# Patient Record
Sex: FEMALE | Race: OTHER | HISPANIC OR LATINO | Employment: FULL TIME | URBAN - METROPOLITAN AREA
[De-identification: names, ages, dates, MRNs, and addresses within clinical notes are randomized per-mention and may not be internally consistent; named-entity substitution may affect disease eponyms.]

---

## 2022-11-29 PROBLEM — J02.9 ACUTE PHARYNGITIS: Status: RESOLVED | Noted: 2022-09-30 | Resolved: 2022-11-29

## 2023-01-25 PROBLEM — G89.29 CHRONIC LEFT SHOULDER PAIN: Status: ACTIVE | Noted: 2023-01-25

## 2023-01-25 PROBLEM — M25.512 CHRONIC LEFT SHOULDER PAIN: Status: ACTIVE | Noted: 2023-01-25

## 2023-03-02 ENCOUNTER — HOSPITAL ENCOUNTER (EMERGENCY)
Facility: HOSPITAL | Age: 58
Discharge: HOME | End: 2023-03-02
Attending: EMERGENCY MEDICINE

## 2023-03-02 ENCOUNTER — APPOINTMENT (EMERGENCY)
Dept: RADIOLOGY | Facility: HOSPITAL | Age: 58
End: 2023-03-02

## 2023-03-02 ENCOUNTER — OCCMED (OUTPATIENT)
Dept: URGENT CARE | Facility: CLINIC | Age: 58
End: 2023-03-02

## 2023-03-02 VITALS
TEMPERATURE: 99 F | RESPIRATION RATE: 18 BRPM | SYSTOLIC BLOOD PRESSURE: 140 MMHG | HEART RATE: 99 BPM | DIASTOLIC BLOOD PRESSURE: 75 MMHG | OXYGEN SATURATION: 97 %

## 2023-03-02 DIAGNOSIS — M25.551 RIGHT HIP PAIN: ICD-10-CM

## 2023-03-02 DIAGNOSIS — S76.011A HIP STRAIN, RIGHT, INITIAL ENCOUNTER: ICD-10-CM

## 2023-03-02 DIAGNOSIS — S83.91XA RIGHT KNEE SPRAIN: Primary | ICD-10-CM
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
## 2023-03-02 DIAGNOSIS — M25.561 ACUTE PAIN OF RIGHT KNEE: Primary | ICD-10-CM

## 2023-03-02 DIAGNOSIS — M54.50 ACUTE RIGHT-SIDED LOW BACK PAIN, UNSPECIFIED WHETHER SCIATICA PRESENT: ICD-10-CM

## 2023-03-02 DIAGNOSIS — M79.661 TENDERNESS OF RIGHT CALF: ICD-10-CM

## 2023-03-02 RX ORDER — OXYCODONE HYDROCHLORIDE 5 MG/1
5 TABLET ORAL ONCE
Status: COMPLETED | OUTPATIENT
Start: 2023-03-02 | End: 2023-03-02

## 2023-03-02 RX ORDER — NAPROXEN 500 MG/1
500 TABLET ORAL 2 TIMES DAILY WITH MEALS
Qty: 20 TABLET | Refills: 0 | Status: SHIPPED | OUTPATIENT
Start: 2023-03-02

## 2023-03-02 RX ORDER — KETOROLAC TROMETHAMINE 30 MG/ML
30 INJECTION, SOLUTION INTRAMUSCULAR; INTRAVENOUS ONCE
Status: COMPLETED | OUTPATIENT
Start: 2023-03-02 | End: 2023-03-02

## 2023-03-02 RX ADMIN — OXYCODONE HYDROCHLORIDE 5 MG: 5 TABLET ORAL at 19:07

## 2023-03-02 RX ADMIN — KETOROLAC TROMETHAMINE 30 MG: 30 INJECTION, SOLUTION INTRAMUSCULAR at 21:09

## 2023-03-02 NOTE — ED PROVIDER NOTES
History  Chief Complaint   Patient presents with   • Knee Injury     Slipped on wet floor yesterday AM  Tess Riedel to urgent care and told to come here for xray and psb US of right knee/calf  Denies hitting head, no LOC  History provided by:  Patient   used: Yes    69-year-old female presented for evaluation after a slip and fall at work early yesterday morning  States that there was water on the floor in the cafeteria, slipped landing on her right knee  She has had pain since, walking with a limp  Unable to go to work today due to the pain  Was seen in urgent care and referred to the ED for imaging  She has been taking Tylenol at home without improvement  Denies head strike  No anticoagulation  No neck or midline back pain  States pain radiates from the right buttock down the leg  Pain is worst about the knee and worse when she attempts to flex the knee  No significant prior injuries  She does report some paresthesias in the right leg  On exam here she seems uncomfortable  She has tenderness about the knee  Range of motion is limited secondary to pain  No ligamentous laxity noted  She has tenderness on the lateral aspect of the thigh and over the right lateral buttock  There is no ecchymosis or hematoma  She has no C-spine, T-spine, L-spine tenderness  No external signs of head trauma  Urgent care notes reviewed -- they had concern for DVT or compartment syndrome as well but her history and exam are not consistent  Injury occurred by direct trauma yesterday  Not consistent with DVT  Her compartments remain soft  Normal pulses, objective sensation  Plan x-rays, pain control, reevaluate      None       Past Medical History:   Diagnosis Date   • Diabetes mellitus [facility])        Past Surgical History:   Procedure Laterality Date   • APPENDECTOMY LAPAROSCOPIC N/A 10/26/2021    Procedure: APPENDECTOMY LAPAROSCOPIC;  Surgeon: Yesenia Gutierrez MD;  Location:  MAIN OR; Service: General   • CHOLECYSTECTOMY     • LAPAROSCOPIC APPENDECTOMY  10/26/2021    Dr Yesi Alcantar, [address]   • OOPHORECTOMY  2005       Family History   Problem Relation Age of Onset   • Diabetes Mother    • No Known Problems Son    • No Known Problems Daughter    • No Known Problems Son      I have reviewed and agree with the history as documented  E-Cigarette/Vaping   • E-Cigarette Use Never User      E-Cigarette/Vaping Substances   • Nicotine No    • THC No    • CBD No    • Flavoring No    • Other No    • Unknown No      Social History     Tobacco Use   • Smoking status: Never   • Smokeless tobacco: Never   Vaping Use   • Vaping Use: Never used   Substance Use Topics   • Alcohol use: Yes     Comment: occasional   • Drug use: Never       Review of Systems   Constitutional: Negative for activity change, appetite change and fatigue  Respiratory: Negative for chest tightness and shortness of breath  Gastrointestinal: Negative for abdominal pain, nausea and vomiting  Genitourinary: Negative for difficulty urinating  Musculoskeletal: Positive for gait problem  Negative for back pain and neck pain  Skin: Negative for color change and wound  Neurological: Positive for numbness  Negative for dizziness, weakness and headaches  All other systems reviewed and are negative  Physical Exam  Physical Exam  Vitals and nursing note reviewed  Constitutional:       Appearance: Normal appearance  HENT:      Head: Normocephalic and atraumatic  Cardiovascular:      Rate and Rhythm: Normal rate and regular rhythm  Pulses: Normal pulses  Pulmonary:      Effort: Pulmonary effort is normal    Abdominal:      General: There is no distension  Palpations: Abdomen is soft  Musculoskeletal:      Cervical back: Normal range of motion and neck supple  No tenderness  Comments: Tenderness about the right knee and lateral thigh, right lateral buttocks    Range of motion of the knee limited secondary to pain  No laxity  Skin:     General: Skin is warm and dry  Capillary Refill: Capillary refill takes less than 2 seconds  Findings: No bruising or erythema  Neurological:      Mental Status: She is alert and oriented to person, place, and time  Motor: No weakness  Comments: Reports paresthesias of the right leg but objective touch sensation remains intact  Psychiatric:         Mood and Affect: Mood normal          Behavior: Behavior normal          Vital Signs  ED Triage Vitals [03/02/23 1745]   Temperature Pulse Respirations Blood Pressure SpO2   99 °F (37 2 °C) 99 18 140/75 97 %      Temp Source Heart Rate Source Patient Position - Orthostatic VS BP Location FiO2 (%)   Oral Monitor Sitting -- --      Pain Score       --           Vitals:    03/02/23 1745   BP: 140/75   Pulse: 99   Patient Position - Orthostatic VS: Sitting         Visual Acuity      ED Medications  Medications   oxyCODONE (ROXICODONE) IR tablet 5 mg (5 mg Oral Given 3/2/23 1907)   ketorolac (TORADOL) injection 30 mg (30 mg Intramuscular Given 3/2/23 2109)       Diagnostic Studies  Results Reviewed     None                 XR hip/pelv 2-3 vws right   Final Result by Josee Graham MD (03/02 2035)      No acute osseous abnormality  Workstation performed: [id number]         XR knee 4+ views Right injury   ED Interpretation by Declan Barraza MD (03/02 1951)   No fracture  Procedures  Procedures         ED Course  ED Course as of 03/02/23 2116   u Mar 02, 2023   2050 X-rays negative for fracture/dislocation  2106 Discussed x-ray findings with patient via   She denies any improvement with oxycodone  We will give Toradol  Plan discharge with NSAIDs  She was provided crutches  We will give note for work  Able to return when cleared for duties by occupational health physician                                               Medical Decision Making  80-year-old female presented for evaluation of right knee and hip pain after a fall early yesterday morning at work  Had slipped on wet floor in the cafeteria  Has been having difficulty bearing weight on the right leg since  Tenderness of the knee and lateral/posterior hip/buttock  X-rays negative for fractures  Range of motion is limited secondary to pain  Provided crutches  Stable for discharge home  Weightbearing as tolerated  NSAIDs  Orthopedics if symptoms persist   Given note for work, to be cleared by occupational health  Hip strain, right, initial encounter: acute illness or injury  Right knee sprain: acute illness or injury  Amount and/or Complexity of Data Reviewed  Radiology: ordered and independent interpretation performed  Risk  Prescription drug management  Disposition  Final diagnoses:   Right knee sprain   Hip strain, right, initial encounter     Time reflects when diagnosis was documented in both MDM as applicable and the Disposition within this note     Time User Action Codes Description Comment    3/2/2023  8:51 PM [address] Right knee sprain     3/2/2023  8:52 PM Ramesh Wood Add [S76 011A] Hip strain, right, initial encounter       ED Disposition     ED Disposition   Discharge    Condition   Stable    Date/Time   Thu Mar 2, 2023  8:51 PM    Comment   [employer] discharge to home/self care                 Follow-up Information     Follow up With Specialties Details Why Contact Info Additional Information    [facility] Emergency Medicine  If symptoms worsen [address] Emergency Department, [address], [place], [place], [address] [facility]  As needed Cristy [address] [place] [id number]  [address] [facility], [address] [place] [address] [facility] [place], [place], [phone]          Patient's Medications   Discharge Prescriptions    NAPROXEN (NAPROSYN) 500 MG TABLET    Take 1 tablet (500 mg total) by mouth 2 (two) times a day with meals       Start Date: 3/2/2023  End Date: --       Order Dose: 500 mg       Quantity: 20 tablet    Refills: 0       No discharge procedures on file      PDMP Review       Value Time User    PDMP Reviewed  Yes 10/27/2021  2:13 PM Mau Gamboa MD          ED Provider  Electronically Signed by           Jad Neri MD  03/02/23 0827

## 2023-03-02 NOTE — Clinical Note
Heather Day was seen and treated in our emergency department on 3/2/2023  Diagnosis: Right knee sprain  Right hip strain  Alba    She may return on this date: May return to work when cleared by occupational health  If you have any questions or concerns, please don't hesitate to call        Melissa Dsouza MD    ______________________________           _______________          _______________  Hospital Representative                              Date                                Time Routine  care, AGA, , BF  F/u [place]

## 2023-03-05 ENCOUNTER — APPOINTMENT (OUTPATIENT)
Dept: URGENT CARE | Facility: CLINIC | Age: 58
End: 2023-03-05

## 2023-03-10 ENCOUNTER — APPOINTMENT (OUTPATIENT)
Dept: URGENT CARE | Facility: CLINIC | Age: 58
End: 2023-03-10

## 2023-03-21 ENCOUNTER — EVALUATION (OUTPATIENT)
Dept: PHYSICAL THERAPY | Facility: CLINIC | Age: 58
End: 2023-03-21

## 2023-03-21 ENCOUNTER — APPOINTMENT (OUTPATIENT)
Dept: URGENT CARE | Facility: CLINIC | Age: 58
End: 2023-03-21

## 2023-03-21 DIAGNOSIS — S86.911D KNEE STRAIN, RIGHT, SUBSEQUENT ENCOUNTER: ICD-10-CM

## 2023-03-21 DIAGNOSIS — M25.561 ACUTE PAIN OF RIGHT KNEE: Primary | ICD-10-CM

## 2023-03-21 NOTE — PROGRESS NOTES
PT Evaluation     Today's date: 3/21/2023  Patient name: Adia Buchanan  : 1965  MRN: [id number]  Referring provider: Jerrell Huerta  Dx:   Encounter Diagnosis     ICD-10-CM    1  Acute pain of right knee  M25 561       2  Knee strain, right, subsequent encounter  R09 [id number]                      Assessment  Assessment details: Problem List:  1) knee hypomobility  2) impaired quad control     Adia Buchanan is a pleasant 62 y o  female who presents with acute R knee pain after a fall at work on 3/2/23 resulting in difficulty with ADLs and functional activities  She has impaired knee mobility and overall impaired quad motor control resulting in difficulty with gait, transfers, and functional mobility  Her greatest concerns are the pain she is experiencing, worry over not knowing what's wrong, concern at no signs of improvement, fear of not being able to keep active and being able to return to work  Patient would benefit from further consultation from orthopedic if she doesn't see improvements with conservative care  She was agreeable to POC 2x/week  Positive prognostic indicators include acuity of symptoms  Negative prognostic indicators include high symptom irritability and language barrier  Comparable signs:  1) knee movement  2) walking  Impairments: abnormal gait, abnormal muscle firing, abnormal muscle tone, abnormal or restricted ROM, abnormal movement, activity intolerance, difficulty understanding, impaired balance, impaired physical strength, lacks appropriate home exercise program, pain with function and weight-bearing intolerance    Symptom irritability: moderateUnderstanding of Dx/Px/POC: good   Prognosis: good    Goals  ST  Patient will demonstrate full knee extension in 4 weeks  2  Patient will demonstrate knee flexion > 120 degrees in 4 weeks  LT  Patient will be able to negotiate stairs reciprocally in 8 weeks     2  Patient will be able to squat and lift > 10 lbs in 8 weeks  3  Patient will be independent with home exercise program    4  Patient will be able to manage symptoms independently  Plan  Patient would benefit from: skilled physical therapy  Planned modality interventions: cryotherapy  Planned therapy interventions: home exercise program, flexibility, functional ROM exercises, graded activity, strengthening, stretching, therapeutic activities, therapeutic exercise, patient education, postural training, motor coordination training, muscle pump exercises, neuromuscular re-education, manual therapy, joint mobilization, abdominal trunk stabilization, activity modification, balance, balance/weight bearing training and body mechanics training  Other planned therapy interventions: kinesiotaping, Ruffin tape  Frequency: 2x week  Duration in weeks: 8  Treatment plan discussed with: patient, PTA and referring physician        Subjective Evaluation    History of Present Illness  Onset date: 3/2/23  Mechanism of injury: Translation with :     She notes that she slipped at work  She notes that her L knee slipped  She was sitting at work to work  She went to the urgent care the next day  She went to the ER  They took x-rays  She was told it wasn't broken and she was given crutches  She had an injury to the arm previously so using the crutches for a little bit hurts her arm  She is using a brace that she was given by urgent care  She notes that the butt is hurting  She has numbness in the knee  She notes a lot of swelling  She works at a [employer]  She was given restrictions but her work doesn't respect them  She works 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, 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 works 8 hours typically     Pain  Current pain ratin  At worst pain ratin  Location: R knee front to lateral/posterior knee  Quality: throbbing  Relieving factors: rest, relaxation, support and medications  Aggravating factors: stair climbing, standing and walking          Objective     Tenderness     Right Knee   Tenderness in the ITB, lateral joint line, lateral patella, medial joint line, medial patella, quadriceps tendon and superior patella  No tenderness in the patellar tendon  Additional Tenderness Details  Joint effusion throughout knee; hypersensitivity to touch throughout; TTP ITB/buttock attributed to landing    Active Range of Motion   Left Knee   Flexion: 135 degrees   Extension: 0 degrees     Right Knee   Flexion: 80 degrees   Extension: 18 degrees     Tests     Left Knee   Negative anterior Lachman, patellar apprehension, patellar compression, valgus stress test at 0 degrees, valgus stress test at 30 degrees, varus stress test at 0 degrees and varus stress test at 30 degrees  Right Knee   Positive patellar apprehension, patellar compression and varus stress test at 30 degrees  Negative anterior Lachman, valgus stress test at 0 degrees, valgus stress test at 30 degrees and varus stress test at 0 degrees         Flowsheet Rows    Flowsheet Row Most Recent Value   PT/OT G-Codes    Current Score 29   Projected Score 56   FOTO information reviewed Yes              Diagnosis: R knee strain, contusion (Patellofemoral pain- R knee hypomobility/impaired quad motor control)   Precautions: DM, Uzbek speaking      Manuals 3/21            Patella mobility             PROM R knee             kinesio patella taping RS                         Neuro Re-Ed                          Quad set             SLR flexion             clamshells             bridges                          TKE             Ther Ex             bike             gastroc stretch             Heel slides                          Leg Press                                                    Ther Activity             Sit to stands                                                                 Gait Training                                       Modalities

## 2023-03-21 NOTE — LETTER
2023    Lindsey Roblero, [address] [facility] [address]    Patient: Shanna Amador   YOB: 1965   Date of Visit: 3/21/2023     Encounter Diagnosis     ICD-10-CM    1  Acute pain of right knee  M25 561       2  Knee strain, right, subsequent encounter  S86 911D           Dear Dr Vidal Razo:    Thank you for your recent referral of Shanna Amador  Please review the attached evaluation summary from Alba's recent visit  Please verify that you agree with the plan of care by signing the attached order  If you have any questions or concerns, please do not hesitate to call  I sincerely appreciate the opportunity to share in the care of one of your patients and hope to have another opportunity to work with you in the near future  Sincerely,    Loren Mccarty, PT      Referring Provider:      I certify that I have read the below Plan of Care and certify the need for these services furnished under this plan of treatment while under my care  Lindsey Roblero PA-C  [address] [place] [id number]  Via Fax: [phone]          PT Evaluation     Today's date: 3/21/2023  Patient name: Shanna Amador  : 1965  MRN: [id number]  Referring provider: Guillermina Crouch  Dx:   Encounter Diagnosis     ICD-10-CM    1  Acute pain of right knee  M25 561       2  Knee strain, right, subsequent encounter  S86 911D                      Assessment  Assessment details: Problem List:  1) knee hypomobility  2) impaired quad control     Shanna Amador is a pleasant 62 y o  female who presents with acute R knee pain after a fall at work on 3/2/23 resulting in difficulty with ADLs and functional activities  She has impaired knee mobility and overall impaired quad motor control resulting in difficulty with gait, transfers, and functional mobility   Her greatest concerns are the pain she is experiencing, worry over not knowing what's wrong, concern at no signs of improvement, fear of not being able to keep active and being able to return to work  Patient would benefit from further consultation from orthopedic if she doesn't see improvements with conservative care  She was agreeable to POC 2x/week  Positive prognostic indicators include acuity of symptoms  Negative prognostic indicators include high symptom irritability and language barrier  Comparable signs:  1) knee movement  2) walking  Impairments: abnormal gait, abnormal muscle firing, abnormal muscle tone, abnormal or restricted ROM, abnormal movement, activity intolerance, difficulty understanding, impaired balance, impaired physical strength, lacks appropriate home exercise program, pain with function and weight-bearing intolerance    Symptom irritability: moderateUnderstanding of Dx/Px/POC: good   Prognosis: good    Goals  ST  Patient will demonstrate full knee extension in 4 weeks  2  Patient will demonstrate knee flexion > 120 degrees in 4 weeks  LT  Patient will be able to negotiate stairs reciprocally in 8 weeks  2  Patient will be able to squat and lift > 10 lbs in 8 weeks  3  Patient will be independent with home exercise program    4  Patient will be able to manage symptoms independently       Plan  Patient would benefit from: skilled physical therapy  Planned modality interventions: cryotherapy  Planned therapy interventions: home exercise program, flexibility, functional ROM exercises, graded activity, strengthening, stretching, therapeutic activities, therapeutic exercise, patient education, postural training, motor coordination training, muscle pump exercises, neuromuscular re-education, manual therapy, joint mobilization, abdominal trunk stabilization, activity modification, balance, balance/weight bearing training and body mechanics training  Other planned therapy interventions: kinesiotaping, Ruffin tape  Frequency: 2x week  Duration in weeks: 8  Treatment plan discussed with: patient, PTA and referring physician        Subjective Evaluation    History of Present Illness  Onset date: 3/2/23  Mechanism of injury: Translation with :     She notes that she slipped at work  She notes that her L knee slipped  She was sitting at work to work  She went to the urgent care the next day  She went to the ER  They took x-rays  She was told it wasn't broken and she was given crutches  She had an injury to the arm previously so using the crutches for a little bit hurts her arm  She is using a brace that she was given by urgent care  She notes that the butt is hurting  She has numbness in the knee  She notes a lot of swelling  She works at [employer]  She was given restrictions but her work doesn't respect them  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, 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 works 8 hours typically  Pain  Current pain ratin  At worst pain ratin  Location: R knee front to lateral/posterior knee  Quality: throbbing  Relieving factors: rest, relaxation, support and medications  Aggravating factors: stair climbing, standing and walking          Objective     Tenderness     Right Knee   Tenderness in the ITB, lateral joint line, lateral patella, medial joint line, medial patella, quadriceps tendon and superior patella  No tenderness in the patellar tendon  Additional Tenderness Details  Joint effusion throughout knee; hypersensitivity to touch throughout; TTP ITB/buttock attributed to landing    Active Range of Motion   Left Knee   Flexion: 135 degrees   Extension: 0 degrees     Right Knee   Flexion: 80 degrees   Extension: 18 degrees     Tests     Left Knee   Negative anterior Lachman, patellar apprehension, patellar compression, valgus stress test at 0 degrees, valgus stress test at 30 degrees, varus stress test at 0 degrees and varus stress test at 30 degrees       Right Knee   Positive patellar apprehension, patellar compression and varus stress test at 30 degrees  Negative anterior Lachman, valgus stress test at 0 degrees, valgus stress test at 30 degrees and varus stress test at 0 degrees         Flowsheet Rows    Flowsheet Row Most Recent Value   PT/OT G-Codes    Current Score 29   Projected Score 56   FOTO information reviewed Yes             Diagnosis: R knee strain, contusion (Patellofemoral pain- R knee hypomobility/impaired quad motor control)   Precautions: DM, Puerto Rican speaking      Manuals 3/21            Patella mobility             PROM R knee             kinesio patella taping RS                         Neuro Re-Ed                          Quad set             SLR flexion             clamshells             bridges                          TKE             Ther Ex             bike             gastroc stretch             Heel slides                          Leg Press                                                    Ther Activity             Sit to stands                                                                 Gait Training                                       Modalities

## 2023-03-22 ENCOUNTER — OFFICE VISIT (OUTPATIENT)
Dept: PHYSICAL THERAPY | Facility: CLINIC | Age: 58
End: 2023-03-22

## 2023-03-22 DIAGNOSIS — M25.561 ACUTE PAIN OF RIGHT KNEE: Primary | ICD-10-CM

## 2023-03-22 DIAGNOSIS — S86.911D KNEE STRAIN, RIGHT, SUBSEQUENT ENCOUNTER: ICD-10-CM

## 2023-03-22 NOTE — PROGRESS NOTES
Daily Note     Today's date: 3/22/2023  Patient name: Brendan Rasmussen  :   MRN: [id number]  Referring provider: Bong Kramer  Dx:   Encounter Diagnosis     ICD-10-CM    1  Acute pain of right knee  M25 561       2  Knee strain, right, subsequent encounter  [id number]                      Subjective: Patient reports that her tape was okay and the knee felt better while it was on  She denies any itching from tape  Objective: See treatment diary below    R knee extension AAROM: 5 degrees with gastroc stretch      Assessment: Tolerated treatment well  Patient would benefit from continued PT  She demonstrated improved tolerance to pendulums on bike for ROM as time went on  She required encouragement for AAROM into flexion this date  She did demonstrate greater knee extension since IE yesterday  She remains guarded and self limiting due to pain with knee flexion  Plan: Continue per plan of care        Diagnosis: R knee strain, contusion (Patellofemoral pain- R knee hypomobility/impaired quad motor control)   Precautions: DM, Mozambican speaking      Manuals 3/21 3/22           Patella mobility  RS           PROM R knee  RS           kinesio patella taping RS RS                        Neuro Re-Ed                          Quad set  5"x20 into towel for biofeedback           SLR flexion             clamshells             bridges                          TKE             Ther Ex             bike  5' pendulums             gastroc stretch  10x10"            Heel slides  5"x20           Seated EOB knee flexion stretch  contralteral OP  5"x10           Leg Press                                                    Ther Activity             Sit to stands                                                                 Gait Training                                       Modalities             CP post session  10'

## 2023-03-28 ENCOUNTER — APPOINTMENT (OUTPATIENT)
Dept: URGENT CARE | Facility: CLINIC | Age: 58
End: 2023-03-28

## 2023-03-28 ENCOUNTER — OFFICE VISIT (OUTPATIENT)
Dept: PHYSICAL THERAPY | Facility: CLINIC | Age: 58
End: 2023-03-28

## 2023-03-28 DIAGNOSIS — S86.911D KNEE STRAIN, RIGHT, SUBSEQUENT ENCOUNTER: ICD-10-CM

## 2023-03-28 DIAGNOSIS — M25.561 ACUTE PAIN OF RIGHT KNEE: Primary | ICD-10-CM

## 2023-03-29 ENCOUNTER — OFFICE VISIT (OUTPATIENT)
Dept: PHYSICAL THERAPY | Facility: CLINIC | Age: 58
End: 2023-03-29

## 2023-03-29 DIAGNOSIS — S86.911D KNEE STRAIN, RIGHT, SUBSEQUENT ENCOUNTER: Primary | ICD-10-CM

## 2023-03-29 DIAGNOSIS — M25.561 ACUTE PAIN OF RIGHT KNEE: ICD-10-CM

## 2023-03-29 NOTE — PROGRESS NOTES
"Daily Note     Today's date: 3/29/2023  Patient name: Sawyer Oliver  :   MRN: [id number]  Referring provider: Ivan Wyatt  Dx:   Encounter Diagnosis     ICD-10-CM    1  Knee strain, right, subsequent encounter  S86 911D       2  Acute pain of right knee  M25 652                      Subjective:She reports that she saw the doctor yesterday and he told her to continue with PT        Objective: See treatment diary below    R knee flexion AROM with encouragement: 80 degrees       Assessment: Tolerated treatment fair  Patient would benefit from continued PT  She demonstrated increased knee flexion AAROM with upright bike for pendulums this date  She demonstrated improved knee flexion in sitting on EOB  She demonstrated quad lag with SLR flexion  She was challenged to perform marching ambulation with hand hold assist due to impaired R SLS tolerance  She demonstrated less muscle guarding with PROM flexion this date  Plan: Continue per plan of care        Diagnosis: R knee strain, contusion (Patellofemoral pain- R knee hypomobility/impaired quad motor control)   Precautions: DM, Serbian speaking      Manuals 3/21 3/22 3/28 3/29         Patella mobility  RS RS RS         PROM R knee  RS RS RS         kinesio patella taping RS RS                        Neuro Re-Ed                          Quad set  5\"x20 into towel for biofeedback 5\"x20 into towel for biofeedback prone 5\"x20    Supine with towel for biofeedback  5\"x20         SLR flexion    2x5          clamshells             bridges             Adductor ball squeeze     5\"x20         TKE   Purple ball at wall  5\"x20          Ther Ex             Bike for ROM  5' pendulums   5' pendulums  5' pendulums upright bike         gastroc stretch  10x10\"  10x10\"           Heel slides  5\"x20 Decline on wedge  5\"x20          Seated EOB knee flexion stretch  contralteral OP  5\"x10 contralateral OP 5\"x10 contralateral OP  5\"x20         Leg Press               " Ther Activity             Sit to stands                                                                 Gait Training             Marching with hand hold assist    1 lap with PT contact guard                      Modalities             CP post session  10'

## 2023-06-02 ENCOUNTER — OFFICE VISIT (OUTPATIENT)
Dept: FAMILY MEDICINE CLINIC | Facility: CLINIC | Age: 58
End: 2023-06-02

## 2023-06-02 VITALS
TEMPERATURE: 98.7 F | DIASTOLIC BLOOD PRESSURE: 72 MMHG | BODY MASS INDEX: 36.08 KG/M2 | SYSTOLIC BLOOD PRESSURE: 124 MMHG | WEIGHT: 179 LBS | HEIGHT: 59 IN

## 2023-06-02 DIAGNOSIS — J01.40 ACUTE PANSINUSITIS, RECURRENCE NOT SPECIFIED: ICD-10-CM

## 2023-06-02 DIAGNOSIS — J02.9 PHARYNGITIS, UNSPECIFIED ETIOLOGY: Primary | ICD-10-CM

## 2023-06-02 LAB
S PYO AG THROAT QL: NEGATIVE
SARS-COV-2 AG UPPER RESP QL IA: NEGATIVE
VALID CONTROL: NORMAL

## 2023-06-02 RX ORDER — BROMPHENIRAMINE MALEATE, PSEUDOEPHEDRINE HYDROCHLORIDE, AND DEXTROMETHORPHAN HYDROBROMIDE 2; 30; 10 MG/5ML; MG/5ML; MG/5ML
5 SYRUP ORAL 4 TIMES DAILY PRN
Qty: 120 ML | Refills: 0 | Status: SHIPPED | OUTPATIENT
Start: 2023-06-02

## 2023-06-02 RX ORDER — AZITHROMYCIN 250 MG/1
TABLET, FILM COATED ORAL
Qty: 6 TABLET | Refills: 0 | Status: SHIPPED | OUTPATIENT
Start: 2023-06-02 | End: 2023-06-07

## 2023-06-02 NOTE — PROGRESS NOTES
Name: Claudia Palmer      :       MRN: [id number]  Encounter Provider: DERIC Murry  Encounter Date: 2023   Encounter department: [facility] MEDICINE    Assessment & Plan     1  Pharyngitis, unspecified etiology  -     POCT Rapid Covid Ag  -     POCT rapid strepA    2  Acute pansinusitis, recurrence not specified  Assessment & Plan:  Sinus pressure and pain, body aches, sore throat, productive cough with yellow sputum for 3 days not improving with home medications  Rapid strep and COVID negative, start z-nico and bromfed DM as prescribed, encourage rest tylenol/ibuprofen as needed for pain  Orders:  -     azithromycin (Zithromax) 250 mg tablet; Take 2 tablets (500 mg total) by mouth daily for 1 day, THEN 1 tablet (250 mg total) daily for 4 days  -     brompheniramine-pseudoephedrine-DM 30-2-10 MG/5ML syrup; Take 5 mL by mouth 4 (four) times a day as needed for congestion, cough or allergies    BMI Counseling: Body mass index is 36 15 kg/m²  The BMI is above normal  Nutrition recommendations include encouraging healthy choices of fruits and vegetables  Exercise recommendations include exercising 3-5 times per week  Rationale for BMI follow-up plan is due to patient being overweight or obese  Subjective      States for the past 3 days have had sore throat, productive cough with yellow sputum, sinus pressure and pain, body aches and headache  Will r/o COVID and strep  Made aware she is overdue for yearly physical and screening and she will f/u to schedule appointment  Review of Systems   Constitutional: Positive for chills and fatigue  Negative for fever  HENT: Positive for congestion, rhinorrhea, sinus pressure, sinus pain and sore throat  Negative for postnasal drip  Eyes: Negative for discharge  Respiratory: Positive for cough  Negative for chest tightness, shortness of breath and wheezing  Cardiovascular: Negative for chest pain  "  Gastrointestinal: Negative for abdominal distention, abdominal pain and nausea  Allergic/Immunologic: Positive for environmental allergies  Neurological: Positive for headaches  Negative for dizziness and facial asymmetry  Current Outpatient Medications on File Prior to Visit   Medication Sig   • naproxen (Naprosyn) 500 mg tablet Take 1 tablet (500 mg total) by mouth 2 (two) times a day with meals       Objective     /72 (BP Location: Left arm, Patient Position: Sitting, Cuff Size: Large)   Temp 98 7 °F (37 1 °C) (Tympanic)   Ht 4' 11\" (1 499 m)   Wt 81 2 kg (179 lb)   BMI 36 15 kg/m²     Physical Exam  Vitals and nursing note reviewed  Constitutional:       General: She is not in acute distress  Appearance: She is well-developed  She is obese  She is ill-appearing  She is not toxic-appearing or diaphoretic  HENT:      Head: Normocephalic and atraumatic  Right Ear: Tympanic membrane and ear canal normal  No drainage, swelling or tenderness  No middle ear effusion  Tympanic membrane is not erythematous  Left Ear: Tympanic membrane and ear canal normal  No drainage, swelling or tenderness  No middle ear effusion  Tympanic membrane is not erythematous  Nose: Congestion and rhinorrhea present  Right Sinus: Maxillary sinus tenderness and frontal sinus tenderness present  Left Sinus: Maxillary sinus tenderness and frontal sinus tenderness present  Mouth/Throat:      Mouth: Mucous membranes are moist       Pharynx: Uvula midline  Posterior oropharyngeal erythema present  No pharyngeal swelling, oropharyngeal exudate or uvula swelling  Tonsils: No tonsillar exudate or tonsillar abscesses  Eyes:      Conjunctiva/sclera: Conjunctivae normal    Neck:      Thyroid: No thyromegaly  Cardiovascular:      Rate and Rhythm: Normal rate and regular rhythm  Heart sounds: Normal heart sounds     Pulmonary:      Effort: Pulmonary effort is normal  No respiratory " distress  Breath sounds: Normal breath sounds  No stridor  No wheezing, rhonchi or rales  Chest:      Chest wall: No tenderness  Abdominal:      General: Bowel sounds are normal       Palpations: Abdomen is soft  Musculoskeletal:      Cervical back: Normal range of motion and neck supple  Lymphadenopathy:      Cervical: No cervical adenopathy  Skin:     General: Skin is warm  Capillary Refill: Capillary refill takes less than 2 seconds  Neurological:      General: No focal deficit present  Mental Status: She is alert and oriented to person, place, and time     Psychiatric:         Mood and Affect: Mood normal          Behavior: Behavior normal        [email or web address] [facility]

## 2023-06-02 NOTE — ASSESSMENT & PLAN NOTE
Sinus pressure and pain, body aches, sore throat, productive cough with yellow sputum for 3 days not improving with home medications  Rapid strep and COVID negative, start z-nico and bromfed DM as prescribed, encourage rest tylenol/ibuprofen as needed for pain

## 2023-06-06 ENCOUNTER — VBI (OUTPATIENT)
Dept: ADMINISTRATIVE | Facility: OTHER | Age: 58
End: 2023-06-06

## 2023-06-15 ENCOUNTER — RA CDI HCC (OUTPATIENT)
Dept: OTHER | Facility: HOSPITAL | Age: 58
End: 2023-06-15

## 2023-06-15 NOTE — PROGRESS NOTES
[facility]  coding opportunities       Chart reviewed, no opportunity found:   [address]        Patients Insurance     Medicare Insurance: [facility]

## 2023-06-22 LAB
LEFT EYE DIABETIC RETINOPATHY: NORMAL
RIGHT EYE DIABETIC RETINOPATHY: NORMAL

## 2023-06-23 ENCOUNTER — OFFICE VISIT (OUTPATIENT)
Dept: FAMILY MEDICINE CLINIC | Facility: CLINIC | Age: 58
End: 2023-06-23
Payer: COMMERCIAL

## 2023-06-23 VITALS
OXYGEN SATURATION: 96 % | RESPIRATION RATE: 16 BRPM | WEIGHT: 179 LBS | DIASTOLIC BLOOD PRESSURE: 70 MMHG | BODY MASS INDEX: 36.08 KG/M2 | HEIGHT: 59 IN | HEART RATE: 90 BPM | SYSTOLIC BLOOD PRESSURE: 120 MMHG | TEMPERATURE: 98.3 F

## 2023-06-23 DIAGNOSIS — N95.0 POSTMENOPAUSAL BLEEDING: ICD-10-CM

## 2023-06-23 DIAGNOSIS — Z00.00 ANNUAL PHYSICAL EXAM: ICD-10-CM

## 2023-06-23 DIAGNOSIS — Z11.59 NEED FOR HEPATITIS C SCREENING TEST: ICD-10-CM

## 2023-06-23 DIAGNOSIS — M25.512 CHRONIC LEFT SHOULDER PAIN: ICD-10-CM

## 2023-06-23 DIAGNOSIS — G89.29 CHRONIC LEFT SHOULDER PAIN: ICD-10-CM

## 2023-06-23 DIAGNOSIS — Z12.11 COLON CANCER SCREENING: Primary | ICD-10-CM

## 2023-06-23 DIAGNOSIS — E66.09 CLASS 2 OBESITY DUE TO EXCESS CALORIES WITHOUT SERIOUS COMORBIDITY WITH BODY MASS INDEX (BMI) OF 36.0 TO 36.9 IN ADULT: ICD-10-CM

## 2023-06-23 DIAGNOSIS — Z12.31 BREAST CANCER SCREENING BY MAMMOGRAM: ICD-10-CM

## 2023-06-23 PROBLEM — E66.812 CLASS 2 OBESITY DUE TO EXCESS CALORIES WITHOUT SERIOUS COMORBIDITY WITH BODY MASS INDEX (BMI) OF 36.0 TO 36.9 IN ADULT: Status: ACTIVE | Noted: 2023-06-23

## 2023-06-23 PROBLEM — K35.80 ACUTE APPENDICITIS: Status: RESOLVED | Noted: 2021-10-26 | Resolved: 2023-06-23

## 2023-06-23 PROBLEM — J01.40 ACUTE PANSINUSITIS: Status: RESOLVED | Noted: 2023-06-02 | Resolved: 2023-06-23

## 2023-06-23 PROCEDURE — 99396 PREV VISIT EST AGE 40-64: CPT | Performed by: FAMILY MEDICINE

## 2023-06-23 PROCEDURE — 99213 OFFICE O/P EST LOW 20 MIN: CPT | Performed by: FAMILY MEDICINE

## 2023-06-23 RX ORDER — DICLOFENAC 16.05 MG/ML
SOLUTION TOPICAL
COMMUNITY
Start: 2023-06-20

## 2023-06-23 RX ORDER — LIDOCAINE, MENTHOL, AND METHYL SALICYLATE 4; 5; 4 MG/100MG; MG/100MG; MG/100MG
PATCH TOPICAL
COMMUNITY
Start: 2023-06-20

## 2023-06-23 NOTE — PROGRESS NOTES
Name: Theodore Hein      : 1965      MRN: [id number]  Encounter Provider: Calos Hanna MD  Encounter Date: 2023   Encounter department: [address] [place] [address] MEDICINE    Assessment & Plan     1  Colon cancer screening  Assessment & Plan:  Send for colonoscopy    Orders:  -     Ambulatory Referral to Gastroenterology; Future    2  Postmenopausal bleeding  Assessment & Plan:  Pt needs to see gyn for biopsy and evaluation    Orders:  -     Ambulatory Referral to Gynecology; Future    3  Need for hepatitis C screening test  -     Hepatitis C Antibody; Future    4  Annual physical exam  Assessment & Plan:  Check routine labs      Orders:  -     CBC and differential; Future  -     Comprehensive metabolic panel; Future; Expected date: 2023  -     Lipid panel; Future; Expected date: 2023  -     Urinalysis with microscopic    5  Breast cancer screening by mammogram  -     Mammo screening bilateral w 3d & cad; Future; Expected date: 2023    6  Class 2 obesity due to excess calories without serious comorbidity with body mass index (BMI) of 36 0 to 36 9 in adult  Assessment & Plan:  Discussion on diet and exercise guidelines for weight loss and  health reviewed with pt         7  Chronic left shoulder pain  Assessment & Plan:  Using a lidoderm patch             Subjective      HPI Patient here for annual physical pt  States she had menopause age 48  Now with period for 5 months again     Review of Systems   Constitutional: Negative for appetite change, chills, fatigue and fever  Respiratory: Negative for cough, chest tightness and shortness of breath  Cardiovascular: Negative for chest pain, palpitations and leg swelling  Gastrointestinal: Negative for abdominal pain, constipation, diarrhea, nausea and vomiting  Genitourinary: Negative for difficulty urinating and frequency  Musculoskeletal: Negative for arthralgias, back pain, gait problem and neck pain     Skin: Negative "for rash  Neurological: Negative for dizziness, weakness, light-headedness, numbness and headaches  Hematological: Does not bruise/bleed easily  Psychiatric/Behavioral: Negative for dysphoric mood and sleep disturbance  The patient is not nervous/anxious  Current Outpatient Medications on File Prior to Visit   Medication Sig   • Diclofenac Sodium 1 5 % SOLN APPLY 40 DROPS TO THE AFFECTED AREA FOUR TIMES DAILY   • LidoPro 4-4-5 % PTCH APPLY 1 PATCH TO THE AFFECTED AREA TWICE DAILY AS NEEDED   • [DISCONTINUED] brompheniramine-pseudoephedrine-DM 30-2-10 MG/5ML syrup Take 5 mL by mouth 4 (four) times a day as needed for congestion, cough or allergies (Patient not taking: Reported on 6/23/2023)   • [DISCONTINUED] naproxen (Naprosyn) 500 mg tablet Take 1 tablet (500 mg total) by mouth 2 (two) times a day with meals (Patient not taking: Reported on 6/23/2023)       Objective     /70 (BP Location: Left arm, Patient Position: Sitting, Cuff Size: Standard)   Pulse 90   Temp 98 3 °F (36 8 °C) (Tympanic)   Resp 16   Ht 4' 11\" (1 499 m)   Wt 81 2 kg (179 lb)   SpO2 96%   BMI 36 15 kg/m²     Physical Exam  Vitals reviewed  Constitutional:       General: She is not in acute distress  Appearance: Normal appearance  She is well-developed  She is obese  HENT:      Head: Normocephalic  Right Ear: Tympanic membrane, ear canal and external ear normal       Left Ear: Tympanic membrane, ear canal and external ear normal       Nose: Nose normal       Mouth/Throat:      Pharynx: No oropharyngeal exudate  Eyes:      General: Lids are normal       Extraocular Movements: Extraocular movements intact  Conjunctiva/sclera: Conjunctivae normal       Pupils: Pupils are equal, round, and reactive to light  Neck:      Thyroid: No thyromegaly  Vascular: No carotid bruit  Cardiovascular:      Rate and Rhythm: Normal rate and regular rhythm  Pulses: Normal pulses        Heart sounds: Normal " heart sounds  No murmur heard  No friction rub  Pulmonary:      Effort: Pulmonary effort is normal  No respiratory distress  Breath sounds: Normal breath sounds  No stridor  No wheezing or rales  Chest:   Breasts:     Breasts are symmetrical       Right: Normal  No swelling, bleeding, inverted nipple, mass, nipple discharge, skin change or tenderness  Left: Normal  No swelling, bleeding, inverted nipple, mass, nipple discharge, skin change or tenderness  Abdominal:      General: Bowel sounds are normal  There is no distension  Palpations: Abdomen is soft  There is no mass  Tenderness: There is no abdominal tenderness  There is no guarding  Hernia: No hernia is present  Musculoskeletal:         General: Normal range of motion  Cervical back: Full passive range of motion without pain, normal range of motion and neck supple  Lymphadenopathy:      Cervical: No cervical adenopathy  Skin:     General: Skin is warm and dry  Findings: No rash  Comments: Nl appearing moles   Neurological:      General: No focal deficit present  Mental Status: She is alert and oriented to person, place, and time  Mental status is at baseline  Cranial Nerves: No cranial nerve deficit  Sensory: No sensory deficit  Motor: No abnormal muscle tone  Coordination: Coordination normal       Gait: Gait normal       Deep Tendon Reflexes: Reflexes normal  Babinski sign absent on the right side  Psychiatric:         Mood and Affect: Mood normal          Speech: Speech normal          Behavior: Behavior normal          Thought Content:  Thought content normal          Judgment: Judgment normal        Candi Burgess MD

## 2023-08-22 PROBLEM — Z12.11 COLON CANCER SCREENING: Status: RESOLVED | Noted: 2023-06-23 | Resolved: 2023-08-22

## 2023-09-11 ENCOUNTER — TELEMEDICINE (OUTPATIENT)
Dept: FAMILY MEDICINE CLINIC | Facility: CLINIC | Age: 58
End: 2023-09-11
Payer: COMMERCIAL

## 2023-09-11 VITALS — BODY MASS INDEX: 36.08 KG/M2 | HEIGHT: 59 IN | WEIGHT: 179 LBS

## 2023-09-11 DIAGNOSIS — B34.9 ACUTE VIRAL SYNDROME: Primary | ICD-10-CM

## 2023-09-11 LAB
SARS-COV-2 AG UPPER RESP QL IA: POSITIVE
VALID CONTROL: ABNORMAL

## 2023-09-11 PROCEDURE — 99213 OFFICE O/P EST LOW 20 MIN: CPT

## 2023-09-11 PROCEDURE — 87811 SARS-COV-2 COVID19 W/OPTIC: CPT

## 2023-09-11 NOTE — ASSESSMENT & PLAN NOTE
Exposure to COVID positive individual, sxs started 9/8/23. Rapid COVID ordered pt will present to office for test. Educate on Paxlovid therapy if positive test, pt declines recommend rest Vit D,C and zinc, tylenol/ibuprofen as needed. Seek immediate medical are for worsening sxs.

## 2023-09-11 NOTE — PROGRESS NOTES
Virtual Regular Visit    Verification of patient location:    Patient is located at Home in the following state in which I hold an active license [place]      Assessment/Plan:    Problem List Items Addressed This Visit        Other    Acute viral syndrome - Primary     Exposure to COVID positive individual, sxs started 9/8/23. Rapid COVID ordered pt will present to office for test. Educate on Paxlovid therapy if positive test, pt declines recommend rest Vit D,C and zinc, tylenol/ibuprofen as needed. Seek immediate medical are for worsening sxs. Relevant Orders    POCT Rapid Covid Ag (Completed)            Reason for visit is   Chief Complaint   Patient presents with   • Headache   • Fever   • Sore Throat   • Virtual Regular Visit        Encounter provider [address]    Provider located at [address] [place] [id number] [phone]      Recent Visits  No visits were found meeting these conditions. Showing recent visits within past 7 days and meeting all other requirements  Today's Visits  Date Type Provider Dept   09/11/23 Telemedicine Kenneth Lee [facility], [address] [facility] today's visits and meeting all other requirements  Future Appointments  No visits were found meeting these conditions. Showing future appointments within next 150 days and meeting all other requirements       The patient was identified by name and date of birth. Dignastormlily Woods was informed that this is a telemedicine visit and that the visit is being conducted through the [employer]. She agrees to proceed. .  My office door was closed. No one else was in the room. She acknowledged consent and understanding of privacy and security of the video platform. The patient has agreed to participate and understands they can discontinue the visit at any time. Patient is aware this is a billable service. Subjective  Mary Orona is a 62 y.o. female  . Pt reports sore throat, H/a, fever, body aches that started on Tuesday her son tested positive for COVID on Tuesday of last week. Will come to office to r/o COVID. Past Medical History:   Diagnosis Date   • Diabetes mellitus [facility])        Past Surgical History:   Procedure Laterality Date   • APPENDECTOMY LAPAROSCOPIC N/A 10/26/2021    Procedure: APPENDECTOMY LAPAROSCOPIC;  Surgeon: Enrique Godoy MD;  Location: [facility] OR;  Service: General   • CHOLECYSTECTOMY     • LAPAROSCOPIC APPENDECTOMY  10/26/2021    Dr. Julia Abdullahi, [address] [facility]   • OOPHORECTOMY  2005       Current Outpatient Medications   Medication Sig Dispense Refill   • Diclofenac Sodium 1.5 % SOLN APPLY 40 DROPS TO THE AFFECTED AREA FOUR TIMES DAILY (Patient not taking: Reported on 9/11/2023)     • LidoPro 4-4-5 % PTCH APPLY 1 PATCH TO THE AFFECTED AREA TWICE DAILY AS NEEDED (Patient not taking: Reported on 9/11/2023)       No current facility-administered medications for this visit. No Known Allergies    Review of Systems   Constitutional: Positive for chills, fatigue and fever. HENT: Positive for congestion and sore throat. Respiratory: Negative for cough, chest tightness and shortness of breath. Cardiovascular: Negative for chest pain and palpitations. Gastrointestinal: Negative for nausea and vomiting. Musculoskeletal: Positive for myalgias. Neurological: Positive for headaches. Video Exam    Vitals:    09/11/23 1301   Weight: 81.2 kg (179 lb)   Height: 4' 11" (1.499 m)       Physical Exam  Vitals and nursing note reviewed. Constitutional:       General: She is not in acute distress. Appearance: She is well-developed. She is obese. She is ill-appearing. She is not toxic-appearing or diaphoretic. HENT:      Head: Normocephalic and atraumatic. Nose: Congestion present.    Eyes:      Conjunctiva/sclera: Conjunctivae normal.   Pulmonary: Effort: Pulmonary effort is normal. No respiratory distress. Neurological:      Mental Status: She is alert and oriented to person, place, and time.    Psychiatric:         Mood and Affect: Mood normal.         Behavior: Behavior normal.          Visit Time  Total Visit Duration: 3

## 2023-11-29 ENCOUNTER — VBI (OUTPATIENT)
Dept: ADMINISTRATIVE | Facility: OTHER | Age: 58
End: 2023-11-29

## 2024-01-25 ENCOUNTER — OFFICE VISIT (OUTPATIENT)
Dept: FAMILY MEDICINE CLINIC | Facility: CLINIC | Age: 59
End: 2024-01-25
Payer: COMMERCIAL

## 2024-01-25 VITALS
WEIGHT: 169 LBS | OXYGEN SATURATION: 98 % | TEMPERATURE: 98.2 F | BODY MASS INDEX: 33.18 KG/M2 | HEIGHT: 60 IN | HEART RATE: 80 BPM | SYSTOLIC BLOOD PRESSURE: 130 MMHG | RESPIRATION RATE: 16 BRPM | DIASTOLIC BLOOD PRESSURE: 70 MMHG

## 2024-01-25 DIAGNOSIS — R05.9 COUGH, UNSPECIFIED TYPE: Primary | ICD-10-CM

## 2024-01-25 PROCEDURE — 99214 OFFICE O/P EST MOD 30 MIN: CPT | Performed by: INTERNAL MEDICINE

## 2024-01-25 RX ORDER — AMOXICILLIN 875 MG/1
875 TABLET, COATED ORAL 2 TIMES DAILY
Qty: 20 TABLET | Refills: 0 | Status: SHIPPED | OUTPATIENT
Start: 2024-01-25 | End: 2024-02-04

## 2024-01-25 NOTE — PROGRESS NOTES
"Assessment/Plan:Likely viral URI so recommend fluids, rest, tylenol prn, decongestants and cough and cold meds-will do a \"wait and see rx\" for Amoxicillin to use if worse or not better         Problem List Items Addressed This Visit    None  Visit Diagnoses       Cough, unspecified type    -  Primary    Relevant Medications    amoxicillin (AMOXIL) 875 mg tablet              Subjective:      Patient ID: Mary Connors is a 58 y.o. female.    Mary with cough, possible fever, congestion, runny nose, tired-covid and flu tests here are negative    Sore Throat   Associated symptoms include congestion and coughing.       The following portions of the patient's history were reviewed and updated as appropriate:   Past Medical History:  She has a past medical history of Diabetes mellitus (HCC).,  _______________________________________________________________________  Medical Problems:  does not have any pertinent problems on file.,  _______________________________________________________________________  Past Surgical History:   has a past surgical history that includes Cholecystectomy; Oophorectomy (2005); Laparoscopic appendectomy (10/26/2021); and APPENDECTOMY LAPAROSCOPIC (N/A, 10/26/2021).,  _______________________________________________________________________  Family History:  family history includes Diabetes in her mother; No Known Problems in her daughter, son, and son.,  _______________________________________________________________________  Social History:   reports that she has never smoked. She has never used smokeless tobacco. She reports that she does not currently use alcohol. She reports that she does not use drugs.,  _______________________________________________________________________  Allergies:  has No Known Allergies..  _______________________________________________________________________  Current Outpatient Medications   Medication Sig Dispense Refill    amoxicillin (AMOXIL) 875 mg tablet Take " "1 tablet (875 mg total) by mouth 2 (two) times a day for 10 days 20 tablet 0     No current facility-administered medications for this visit.     _______________________________________________________________________  Review of Systems   Constitutional:  Positive for fever.   HENT:  Positive for congestion, rhinorrhea, sinus pressure, sinus pain and sore throat.    Respiratory:  Positive for cough.    Cardiovascular: Negative.    Gastrointestinal: Negative.    Neurological: Negative.    Hematological: Negative.    Psychiatric/Behavioral: Negative.           Objective:  Vitals:    01/25/24 1025   BP: 130/70   BP Location: Left arm   Patient Position: Sitting   Cuff Size: Standard   Pulse: 80   Resp: 16   Temp: 98.2 °F (36.8 °C)   TempSrc: Tympanic   SpO2: 98%   Weight: 76.7 kg (169 lb)   Height: 4' 11.5\" (1.511 m)     Body mass index is 33.56 kg/m².     Physical Exam  Constitutional:       Appearance: She is well-developed.   HENT:      Head: Normocephalic and atraumatic.      Nose: Congestion and rhinorrhea present.      Mouth/Throat:      Pharynx: No pharyngeal swelling or posterior oropharyngeal erythema.   Eyes:      Conjunctiva/sclera: Conjunctivae normal.      Pupils: Pupils are equal, round, and reactive to light.   Cardiovascular:      Rate and Rhythm: Normal rate and regular rhythm.      Heart sounds: Normal heart sounds.   Pulmonary:      Effort: Pulmonary effort is normal.      Breath sounds: Normal breath sounds.   Musculoskeletal:      Cervical back: Normal range of motion and neck supple.   Neurological:      General: No focal deficit present.      Mental Status: She is alert.   Psychiatric:         Mood and Affect: Mood normal.         Behavior: Behavior normal.         "

## 2024-01-29 ENCOUNTER — VBI (OUTPATIENT)
Dept: ADMINISTRATIVE | Facility: OTHER | Age: 59
End: 2024-01-29

## 2024-01-30 ENCOUNTER — VBI (OUTPATIENT)
Dept: ADMINISTRATIVE | Facility: OTHER | Age: 59
End: 2024-01-30

## 2024-02-27 ENCOUNTER — OFFICE VISIT (OUTPATIENT)
Dept: FAMILY MEDICINE CLINIC | Facility: CLINIC | Age: 59
End: 2024-02-27
Payer: MEDICARE

## 2024-02-27 VITALS
SYSTOLIC BLOOD PRESSURE: 130 MMHG | HEART RATE: 84 BPM | DIASTOLIC BLOOD PRESSURE: 72 MMHG | HEIGHT: 60 IN | TEMPERATURE: 99.7 F | BODY MASS INDEX: 33.1 KG/M2 | OXYGEN SATURATION: 95 % | WEIGHT: 168.6 LBS

## 2024-02-27 DIAGNOSIS — J11.1 INFLUENZA: ICD-10-CM

## 2024-02-27 DIAGNOSIS — J06.9 ACUTE URI: Primary | ICD-10-CM

## 2024-02-27 LAB
SARS-COV-2 AG UPPER RESP QL IA: NEGATIVE
SL AMB POCT RAPID FLU A: NORMAL
SL AMB POCT RAPID FLU B: NORMAL
VALID CONTROL: NORMAL

## 2024-02-27 PROCEDURE — 87804 INFLUENZA ASSAY W/OPTIC: CPT | Performed by: FAMILY MEDICINE

## 2024-02-27 PROCEDURE — 99213 OFFICE O/P EST LOW 20 MIN: CPT | Performed by: FAMILY MEDICINE

## 2024-02-27 PROCEDURE — 87811 SARS-COV-2 COVID19 W/OPTIC: CPT | Performed by: FAMILY MEDICINE

## 2024-02-27 RX ORDER — OSELTAMIVIR PHOSPHATE 75 MG/1
75 CAPSULE ORAL EVERY 12 HOURS SCHEDULED
Qty: 10 CAPSULE | Refills: 0 | Status: SHIPPED | OUTPATIENT
Start: 2024-02-27 | End: 2024-03-03

## 2024-02-27 NOTE — ASSESSMENT & PLAN NOTE
Patient to increase rest and fluids. Will start tamiflu. Patient to continue to treat symptoms as needed.

## 2024-02-27 NOTE — PROGRESS NOTES
Assessment/Plan:         Problem List Items Addressed This Visit        Respiratory    Influenza     Patient to increase rest and fluids. Will start tamiflu. Patient to continue to treat symptoms as needed.          Relevant Medications    oseltamivir (TAMIFLU) 75 mg capsule    Acute URI - Primary     Patient has had it since yesterday. Rapid COVID test negative and Rapid flu test negative. However, her son tested positive for influenza B now. Based on her symptoms, will treat her with tamiflu. Patient to increase rest and fluids as well and continue to treat symptoms as needed.          Relevant Orders    POCT rapid flu A and B (Completed)    POCT Rapid Covid Ag (Completed)           Subjective:      Patient ID: Mary Connors is a 58 y.o. female.    Patient here for 2 day history of fever, aches, sore throat, cough, diarrhea, fatigue. Son also sick. Getting worse. Taking mucinex. Son tested positive for Influenza B.         The following portions of the patient's history were reviewed and updated as appropriate:   Past Medical History:  She has a past medical history of Diabetes mellitus (HCC).,  _______________________________________________________________________  Medical Problems:  does not have any pertinent problems on file.,  _______________________________________________________________________  Past Surgical History:   has a past surgical history that includes Cholecystectomy; Oophorectomy (2005); Laparoscopic appendectomy (10/26/2021); and APPENDECTOMY LAPAROSCOPIC (N/A, 10/26/2021).,  _______________________________________________________________________  Family History:  family history includes Diabetes in her mother; No Known Problems in her daughter, son, and son.,  _______________________________________________________________________  Social History:   reports that she has never smoked. She has never used smokeless tobacco. She reports that she does not currently use alcohol. She reports  "that she does not use drugs.,  _______________________________________________________________________  Allergies:  has No Known Allergies..  _______________________________________________________________________  Current Outpatient Medications   Medication Sig Dispense Refill   • oseltamivir (TAMIFLU) 75 mg capsule Take 1 capsule (75 mg total) by mouth every 12 (twelve) hours for 5 days 10 capsule 0     No current facility-administered medications for this visit.     _______________________________________________________________________  Review of Systems   Constitutional:  Positive for fatigue and fever. Negative for chills.   HENT:  Positive for congestion and sore throat. Negative for ear pain, postnasal drip, rhinorrhea, sinus pressure, sinus pain and trouble swallowing.    Respiratory:  Positive for cough. Negative for chest tightness, shortness of breath and wheezing.    Cardiovascular:  Negative for chest pain.   Gastrointestinal:  Negative for abdominal pain, diarrhea, nausea and vomiting.   Genitourinary:  Negative for difficulty urinating.   Musculoskeletal:  Positive for myalgias. Negative for arthralgias.   Skin:  Negative for rash.   Neurological:  Positive for headaches. Negative for dizziness.         Objective:  Vitals:    02/27/24 1457   BP: 130/72   BP Location: Left arm   Patient Position: Sitting   Cuff Size: Standard   Pulse: 84   Temp: 99.7 °F (37.6 °C)   TempSrc: Tympanic   SpO2: 95%   Weight: 76.5 kg (168 lb 9.6 oz)   Height: 4' 11.5\" (1.511 m)     Body mass index is 33.48 kg/m².     Physical Exam  Vitals and nursing note reviewed.   Constitutional:       General: She is not in acute distress.     Appearance: Normal appearance. She is well-developed. She is ill-appearing. She is not toxic-appearing.   HENT:      Head: Normocephalic and atraumatic.      Right Ear: Tympanic membrane and ear canal normal.      Left Ear: Tympanic membrane and ear canal normal.      Nose: Congestion present. No " rhinorrhea.      Mouth/Throat:      Mouth: Mucous membranes are moist.      Pharynx: Oropharynx is clear. Posterior oropharyngeal erythema present. No oropharyngeal exudate.   Cardiovascular:      Rate and Rhythm: Normal rate and regular rhythm.      Heart sounds: Normal heart sounds. No murmur heard.  Pulmonary:      Effort: Pulmonary effort is normal. No respiratory distress.      Breath sounds: Normal breath sounds. No wheezing.   Musculoskeletal:      Cervical back: Normal range of motion and neck supple.   Lymphadenopathy:      Cervical: No cervical adenopathy.   Neurological:      Mental Status: She is alert.

## 2024-02-27 NOTE — ASSESSMENT & PLAN NOTE
Patient has had it since yesterday. Rapid COVID test negative and Rapid flu test negative. However, her son tested positive for influenza B now. Based on her symptoms, will treat her with tamiflu. Patient to increase rest and fluids as well and continue to treat symptoms as needed.

## 2024-04-27 PROBLEM — J11.1 INFLUENZA: Status: RESOLVED | Noted: 2024-02-27 | Resolved: 2024-04-27

## 2024-04-27 PROBLEM — J06.9 ACUTE URI: Status: RESOLVED | Noted: 2021-02-22 | Resolved: 2024-04-27

## 2025-06-22 ENCOUNTER — HOSPITAL ENCOUNTER (EMERGENCY)
Facility: HOSPITAL | Age: 60
Discharge: HOME | End: 2025-06-22
Attending: EMERGENCY MEDICINE | Admitting: EMERGENCY MEDICINE
Payer: COMMERCIAL

## 2025-06-22 ENCOUNTER — APPOINTMENT (EMERGENCY)
Dept: RADIOLOGY | Facility: HOSPITAL | Age: 60
End: 2025-06-22
Payer: COMMERCIAL

## 2025-06-22 VITALS
DIASTOLIC BLOOD PRESSURE: 69 MMHG | RESPIRATION RATE: 18 BRPM | SYSTOLIC BLOOD PRESSURE: 134 MMHG | HEART RATE: 72 BPM | OXYGEN SATURATION: 99 % | TEMPERATURE: 98.1 F

## 2025-06-22 DIAGNOSIS — M25.512 ACUTE PAIN OF LEFT SHOULDER: Primary | ICD-10-CM

## 2025-06-22 DIAGNOSIS — G56.22 CUBITAL TUNNEL SYNDROME ON LEFT: ICD-10-CM

## 2025-06-22 DIAGNOSIS — R07.9 CHEST PAIN: ICD-10-CM

## 2025-06-22 LAB
ALBUMIN SERPL BCG-MCNC: 4.2 G/DL (ref 3.5–5)
ALP SERPL-CCNC: 77 U/L (ref 34–104)
ALT SERPL W P-5'-P-CCNC: 15 U/L (ref 7–52)
ANION GAP SERPL CALCULATED.3IONS-SCNC: 5 MMOL/L (ref 4–13)
AST SERPL W P-5'-P-CCNC: 19 U/L (ref 13–39)
ATRIAL RATE: 60 BPM
BASOPHILS # BLD AUTO: 0.01 THOUSANDS/ÂΜL (ref 0–0.1)
BASOPHILS NFR BLD AUTO: 0 % (ref 0–1)
BILIRUB SERPL-MCNC: 0.39 MG/DL (ref 0.2–1)
BUN SERPL-MCNC: 15 MG/DL (ref 5–25)
CALCIUM SERPL-MCNC: 9.3 MG/DL (ref 8.4–10.2)
CARDIAC TROPONIN I PNL SERPL HS: 3 NG/L (ref ?–50)
CHLORIDE SERPL-SCNC: 107 MMOL/L (ref 96–108)
CO2 SERPL-SCNC: 26 MMOL/L (ref 21–32)
CREAT SERPL-MCNC: 0.45 MG/DL (ref 0.6–1.3)
EOSINOPHIL # BLD AUTO: 0.12 THOUSAND/ÂΜL (ref 0–0.61)
EOSINOPHIL NFR BLD AUTO: 1 % (ref 0–6)
ERYTHROCYTE [DISTWIDTH] IN BLOOD BY AUTOMATED COUNT: 13.3 % (ref 11.6–15.1)
GFR SERPL CREATININE-BSD FRML MDRD: 109 ML/MIN/1.73SQ M
GLUCOSE SERPL-MCNC: 134 MG/DL (ref 65–140)
HCT VFR BLD AUTO: 41.7 % (ref 34.8–46.1)
HGB BLD-MCNC: 13.1 G/DL (ref 11.5–15.4)
IMM GRANULOCYTES # BLD AUTO: 0.03 THOUSAND/UL (ref 0–0.2)
IMM GRANULOCYTES NFR BLD AUTO: 0 % (ref 0–2)
LYMPHOCYTES # BLD AUTO: 3.67 THOUSANDS/ÂΜL (ref 0.6–4.47)
LYMPHOCYTES NFR BLD AUTO: 40 % (ref 14–44)
MCH RBC QN AUTO: 28.4 PG (ref 26.8–34.3)
MCHC RBC AUTO-ENTMCNC: 31.4 G/DL (ref 31.4–37.4)
MCV RBC AUTO: 91 FL (ref 82–98)
MONOCYTES # BLD AUTO: 0.52 THOUSAND/ÂΜL (ref 0.17–1.22)
MONOCYTES NFR BLD AUTO: 6 % (ref 4–12)
NEUTROPHILS # BLD AUTO: 4.84 THOUSANDS/ÂΜL (ref 1.85–7.62)
NEUTS SEG NFR BLD AUTO: 53 % (ref 43–75)
NRBC BLD AUTO-RTO: 0 /100 WBCS
P AXIS: 68 DEGREES
PLATELET # BLD AUTO: 319 THOUSANDS/UL (ref 149–390)
PMV BLD AUTO: 9.2 FL (ref 8.9–12.7)
POTASSIUM SERPL-SCNC: 3.8 MMOL/L (ref 3.5–5.3)
PR INTERVAL: 176 MS
PROT SERPL-MCNC: 7.6 G/DL (ref 6.4–8.4)
QRS AXIS: 46 DEGREES
QRSD INTERVAL: 82 MS
QT INTERVAL: 416 MS
QTC INTERVAL: 416 MS
RBC # BLD AUTO: 4.61 MILLION/UL (ref 3.81–5.12)
SODIUM SERPL-SCNC: 138 MMOL/L (ref 135–147)
T WAVE AXIS: 35 DEGREES
VENTRICULAR RATE: 60 BPM
WBC # BLD AUTO: 9.19 THOUSAND/UL (ref 4.31–10.16)

## 2025-06-22 PROCEDURE — 36415 COLL VENOUS BLD VENIPUNCTURE: CPT

## 2025-06-22 PROCEDURE — 84484 ASSAY OF TROPONIN QUANT: CPT

## 2025-06-22 PROCEDURE — 99285 EMERGENCY DEPT VISIT HI MDM: CPT

## 2025-06-22 PROCEDURE — 80053 COMPREHEN METABOLIC PANEL: CPT

## 2025-06-22 PROCEDURE — 93005 ELECTROCARDIOGRAM TRACING: CPT

## 2025-06-22 PROCEDURE — 99284 EMERGENCY DEPT VISIT MOD MDM: CPT

## 2025-06-22 PROCEDURE — 96374 THER/PROPH/DIAG INJ IV PUSH: CPT

## 2025-06-22 PROCEDURE — 85025 COMPLETE CBC W/AUTO DIFF WBC: CPT

## 2025-06-22 PROCEDURE — 73030 X-RAY EXAM OF SHOULDER: CPT

## 2025-06-22 PROCEDURE — 93010 ELECTROCARDIOGRAM REPORT: CPT | Performed by: INTERNAL MEDICINE

## 2025-06-22 PROCEDURE — 71045 X-RAY EXAM CHEST 1 VIEW: CPT

## 2025-06-22 RX ORDER — ACETAMINOPHEN 325 MG/1
975 TABLET ORAL ONCE
Status: COMPLETED | OUTPATIENT
Start: 2025-06-22 | End: 2025-06-22

## 2025-06-22 RX ORDER — KETOROLAC TROMETHAMINE 30 MG/ML
15 INJECTION, SOLUTION INTRAMUSCULAR; INTRAVENOUS ONCE
Status: COMPLETED | OUTPATIENT
Start: 2025-06-22 | End: 2025-06-22

## 2025-06-22 RX ADMIN — KETOROLAC TROMETHAMINE 15 MG: 30 INJECTION, SOLUTION INTRAMUSCULAR; INTRAVENOUS at 03:20

## 2025-06-22 RX ADMIN — ACETAMINOPHEN 975 MG: 325 TABLET ORAL at 03:20

## 2025-06-22 NOTE — Clinical Note
Mary Connors was seen and treated in our emergency department on 6/22/2025.                Diagnosis:     Alba  .    She may return on this date:          If you have any questions or concerns, please don't hesitate to call.      Elsa Mckeon PA-C    ______________________________           _______________          _______________  Hospital Representative                              Date                                Time

## 2025-06-22 NOTE — DISCHARGE INSTRUCTIONS
Encourage you to alternate Tylenol and Motrin at home for pain.  Can continue using lidocaine patches.  Placed a referral for you to follow-up with a shoulder orthopedic specialist for your shoulder pain and a hand orthopedic specialist for the cubital tunnel syndrome of your hand.  Placed a referral for you to follow-up with cardiology.  Also encourage you to follow-up with primary care.  Return to the ER if your symptoms worsen.

## 2025-06-22 NOTE — ED PROVIDER NOTES
Time reflects when diagnosis was documented in both MDM as applicable and the Disposition within this note       Time User Action Codes Description Comment    6/22/2025  4:13 AM Elsa Mckeon Add [M25.512] Acute pain of left shoulder     6/22/2025  4:13 AM [place] Add [R07.9] Chest pain     6/22/2025  4:14 AM Mitra Mckeonissa Add [G56.22] Cubital tunnel syndrome on left           ED Disposition       ED Disposition   Discharge    Condition   Stable    Date/Time   Sun Jun 22, 2025  4:12 AM    Comment   Mary Connors discharge to home/self care.                   Assessment & Plan       Medical Decision Making  Patient seen, examined and noted to have acute pain of left shoulder, chest pain and cubital tunnel syndrome of left.  Encounter was facilitated using an iPad  as well as daughter at bedside per patient's consent.  Patient coming in with a variety of complaints.  Primary complaint today is the shoulder pain.  She had an accident at work many years ago for which she went to physical therapy for her left shoulder.  States it has not bothered her that much since she completed PT.  She has been mopping a lot at work and states that the pain has returned in the last 3 to 4 days.  Additionally patient says that for 2 to 3 months she has had pain to the medial portion of her left elbow and her small and ring finger on her left hand occasionally get numb and feel slower than the rest of her fingers.  Also notes intermittent chest pain for the last 5 months.  Has not noticed a pattern to this chest pain.  Does not follow with cardiology.  EKG showing normal sinus rhythm.  Chest x-ray without any acute cardiopulmonary abnormalities per my read.  X-ray of left shoulder largely unremarkable.  CBC CMP and troponin grossly unremarkable.  Pain improved with Toradol and Tylenol.  Marshal supportive care measures for pain at home and placed a referral to shoulder and elbow orthopedics for her to follow-up  with for both her shoulder and elbow pain.  Also placed a referral for cardiology due to the duration of her symptoms.  ER return precautions given and primary care follow-up encouraged.    Differential diagnosis includes but is not limited to tendinitis, bursitis, arthritis, rotator cuff injury, fracture, dislocation, contusion, strain, sprain, brachial plexus impingement, radiculopathy, cubital tunnel, carpal tunnel, peripheral neuropathies, chest wall pain, doubt ACS or MI     Patient's labs notable for: CBC CMP and troponin unremarkable    Imaging revealed:   Interpreted by myself as above    EKG: Was interpreted by myself as above.     Patient appears well, is nontoxic appearing, she expresses understanding and agrees with plan of care at this time.  In light of this patient would benefit from outpatient management.    Patient at time of discharge well-appearing in no acute distress, all questions answered. Patient agreeable to plan.  Patient's vitals, lab/imaging results, diagnosis, and treatment plan were discussed with the patient. All new/changed medications were discussed with patient, specifically, route of administration, how often and when to take, and where they can be picked up. Strict return precautions as well as close follow up with PCP was discussed with the patient and the patient was agreeable to my recommendations. Patient verbally acknowledged understanding of the above communications. Strict return precautions were provided. All labs reviewed and utilized in the medical decision making process.    Amount and/or Complexity of Data Reviewed  Labs: ordered.  Radiology: ordered and independent interpretation performed.    Risk  OTC drugs.  Prescription drug management.             Medications   ketorolac (TORADOL) injection 15 mg (15 mg Intravenous Given 6/22/25 0320)   acetaminophen (TYLENOL) tablet 975 mg (975 mg Oral Given 6/22/25 0320)       ED Risk Strat Scores   HEART Risk Score       Flowsheet Row Most Recent Value   Heart Score Risk Calculator    History 0 Filed at: 06/22/2025 0415   ECG 0 Filed at: 06/22/2025 0415   Age 2 Filed at: 06/22/2025 0415   Risk Factors 1 Filed at: 06/22/2025 0415   Troponin 0 Filed at: 06/22/2025 0415   HEART Score 3 Filed at: 06/22/2025 0415          HEART Risk Score      Flowsheet Row Most Recent Value   Heart Score Risk Calculator    History 0 Filed at: 06/22/2025 0415   ECG 0 Filed at: 06/22/2025 0415   Age 2 Filed at: 06/22/2025 0415   Risk Factors 1 Filed at: 06/22/2025 0415   Troponin 0 Filed at: 06/22/2025 0415   HEART Score 3 Filed at: 06/22/2025 0415                      No data recorded                            History of Present Illness       Chief Complaint   Patient presents with    Shoulder Pain     Pt reports L shoulder pain that started yesterday. She went to work but had to leave early. Pt used lidocaine patch but no oral meds. Also reports L arm numbness, difficulty moving ring and pinky finger on L hand. Sometimes she has chest pain, ongoing for 5 months.        Past Medical History[1]   Past Surgical History[2]   Family History[3]   Social History[4]   E-Cigarette/Vaping    E-Cigarette Use Never User       E-Cigarette/Vaping Substances    Nicotine No     THC No     CBD No     Flavoring No     Other No     Unknown No       I have reviewed and agree with the history as documented.     Mary Connors is a 60 y.o. female presenting to the ED on June 22, 2025 with shoulder pain. Patient endorses that she had an accident at work many years ago for which she went to physical therapy for her left shoulder.  States it has not bothered her that much since she completed PT.  She has been mopping a lot at work and states that the pain has returned in the last 3 to 4 days.  Patient has been mopping a lot at work and states that this pain has made it hard to complete her work.  Especially having pain with abduction of her arm.  Patient also notes that  for 2 to 3 months she has had pain to the medial portion of her left elbow and her small and ring finger on her left hand occasionally get numb and feel slower than the rest of her fingers.  Also notes intermittent chest pain for the last 5 months. Has not noticed a pattern to this chest pain. Patient denies shortness of breath, falls or trauma to her shoulder or elbow or any other complaints at this time.         Shoulder Pain  Associated symptoms: no fever        Review of Systems   Constitutional:  Negative for chills and fever.   Respiratory:  Negative for cough and shortness of breath.    Cardiovascular:  Positive for chest pain. Negative for palpitations.   Musculoskeletal:  Positive for arthralgias.        Left shoulder and left elbow pain   Skin:  Negative for color change and wound.   Neurological:  Positive for weakness and numbness.        To her left small and ring finger, has been going on for 2-3 months           Objective       ED Triage Vitals   Temperature Pulse Blood Pressure Respirations SpO2 Patient Position - Orthostatic VS   06/22/25 0231 06/22/25 0231 06/22/25 0231 06/22/25 0231 06/22/25 0231 06/22/25 0231   98.1 °F (36.7 °C) 72 134/69 18 99 % Sitting      Temp Source Heart Rate Source BP Location FiO2 (%) Pain Score    06/22/25 0231 06/22/25 0231 06/22/25 0231 -- 06/22/25 0320    Oral Monitor Left arm  7      Vitals      Date and Time Temp Pulse SpO2 Resp BP Pain Score FACES Pain Rating User   06/22/25 0320 -- -- -- -- -- 7 -- NB   06/22/25 0231 98.1 °F (36.7 °C) 72 99 % 18 134/69 -- -- KD            Physical Exam  Constitutional:       General: She is not in acute distress.     Appearance: Normal appearance. She is normal weight. She is not ill-appearing or toxic-appearing.     Cardiovascular:      Rate and Rhythm: Normal rate and regular rhythm.      Heart sounds: Normal heart sounds. No murmur heard.     No friction rub. No gallop.   Pulmonary:      Effort: Pulmonary effort is normal. No  respiratory distress.      Breath sounds: Normal breath sounds. No stridor. No wheezing, rhonchi or rales.   Chest:      Chest wall: No tenderness.     Musculoskeletal:         General: Tenderness present. No swelling, deformity or signs of injury. Normal range of motion.      Comments: Full ROM of left shoulder, left elbow and neck. Pain with abduction of left arm.  Tender to palpation of left shoulder     Skin:     General: Skin is warm.      Findings: No erythema or rash.     Neurological:      General: No focal deficit present.      Mental Status: She is alert and oriented to person, place, and time.      Sensory: No sensory deficit.      Motor: No weakness.      Comments: Sensation of left upper extremity intact.  Notably sensation and strength of left little finger and left ring finger intact       Results Reviewed       Procedure Component Value Units Date/Time    HS Troponin 0hr (reflex protocol) [[id number]]  (Normal) Collected: 06/22/25 0320    Lab Status: Final result Specimen: Blood from Arm, Left Updated: 06/22/25 0358     hs TnI 0hr 3 ng/L     Comprehensive metabolic panel [[id number]]  (Abnormal) Collected: 06/22/25 0320    Lab Status: Final result Specimen: Blood from Arm, Left Updated: 06/22/25 0355     Sodium 138 mmol/L      Potassium 3.8 mmol/L      Chloride 107 mmol/L      CO2 26 mmol/L      ANION GAP 5 mmol/L      BUN 15 mg/dL      Creatinine 0.45 mg/dL      Glucose 134 mg/dL      Calcium 9.3 mg/dL      AST 19 U/L      ALT 15 U/L      Alkaline Phosphatase 77 U/L      Total Protein 7.6 g/dL      Albumin 4.2 g/dL      Total Bilirubin 0.39 mg/dL      eGFR 109 ml/min/1.73sq m     Narrative:      [employer] Kid[employer] guidelines for Chronic Kidney Disease (CKD):     Stage 1 with normal or high GFR (GFR > 90 mL/min/1.73 square meters)    Stage 2 Mild CKD (GFR = 60-89 mL/min/1.73 square meters)    Stage 3A Moderate CKD (GFR = 45-59 mL/min/1.73 square meters)    Stage 3B Moderate CKD (GFR =  30-44 mL/min/1.73 square meters)    Stage 4 Severe CKD (GFR = 15-29 mL/min/1.73 square meters)    Stage 5 End Stage CKD (GFR <15 mL/min/1.73 square meters)  Note: GFR calculation is accurate only with a steady state creatinine    CBC and differential [[id number]] Collected: 06/22/25 0320    Lab Status: Final result Specimen: Blood from Arm, Left Updated: 06/22/25 0340     WBC 9.19 Thousand/uL      RBC 4.61 Million/uL      Hemoglobin 13.1 g/dL      Hematocrit 41.7 %      MCV 91 fL      MCH 28.4 pg      MCHC 31.4 g/dL      RDW 13.3 %      MPV 9.2 fL      Platelets 319 Thousands/uL      nRBC 0 /100 WBCs      Segmented % 53 %      Immature Grans % 0 %      Lymphocytes % 40 %      Monocytes % 6 %      Eosinophils Relative 1 %      Basophils Relative 0 %      Absolute Neutrophils 4.84 Thousands/µL      Absolute Immature Grans 0.03 Thousand/uL      Absolute Lymphocytes 3.67 Thousands/µL      Absolute Monocytes 0.52 Thousand/µL      Eosinophils Absolute 0.12 Thousand/µL      Basophils Absolute 0.01 Thousands/µL             XR chest 1 view portable   ED Interpretation by Elsa Mckeon PA-C (06/22 0412)   Image was independently interpreted by myself and showed no acute concerns of cardiopulmonary disease at this time.       XR shoulder 2+ views LEFT   ED Interpretation by Elsa Mckeon PA-C (06/22 0412)   No fractures or dislocations per my read          ECG 12 Lead Documentation Only    Date/Time: 6/22/2025 6:26 AM    Performed by: Elsa Mckeon PA-C  Authorized by: Elsa Mckeon PA-C    Indications / Diagnosis:  Intermittent cp x 5 months  ECG reviewed by me, the ED Provider: yes    Patient location:  ED  Previous ECG:     Previous ECG:  Compared to current    Comparison ECG info:  2021    Similarity:  No change    Comparison to cardiac monitor: Yes    Interpretation:     Interpretation: normal    Rate:     ECG rate:  60    ECG rate assessment: normal    Rhythm:     Rhythm: sinus rhythm    Ectopy:     Ectopy:  none    QRS:     QRS axis:  Normal    QRS intervals:  Normal  Conduction:     Conduction: normal    ST segments:     ST segments:  Normal  T waves:     T waves: normal        ED Medication and Procedure Management   None     There are no discharge medications for this patient.      ED SEPSIS DOCUMENTATION   Time reflects when diagnosis was documented in both MDM as applicable and the Disposition within this note       Time User Action Codes Description Comment    6/22/2025  4:13 AM Elsa Mckeon [M25.512] Acute pain of left shoulder     6/22/2025  4:13 AM Elsa Mckeon [R07.9] Chest pain     6/22/2025  4:14 AM Elsa Mckeon [G56.22] Cubital tunnel syndrome on left                      [1]   Past Medical History:  Diagnosis Date    Diabetes mellitus (HCC)    [2]   Past Surgical History:  Procedure Laterality Date    APPENDECTOMY LAPAROSCOPIC N/A 10/26/2021    Procedure: APPENDECTOMY LAPAROSCOPIC;  Surgeon: Robert Bloch, MD;  Location:  MAIN OR;  Service: General    CHOLECYSTECTOMY      LAPAROSCOPIC APPENDECTOMY  10/26/2021    Dr. Bloch, [facility]    OOPHORECTOMY  2005   [3]   Family History  Problem Relation Name Age of Onset    Diabetes Mother      No Known Problems Son      No Known Problems Daughter      No Known Problems Son     [4]   Social History  Tobacco Use    Smoking status: Never    Smokeless tobacco: Never   Vaping Use    Vaping status: Never Used   Substance Use Topics    Alcohol use: Not Currently    Drug use: Never        Elsa Mckeon PA-C  06/22/25 0628

## 2025-06-23 ENCOUNTER — TELEPHONE (OUTPATIENT)
Dept: ADMINISTRATIVE | Facility: OTHER | Age: 60
End: 2025-06-23

## 2025-06-23 NOTE — TELEPHONE ENCOUNTER
06/23/25 11:20 AM    Patient contacted post ED visit, [facility] department spoke with patient/caregiver and outreach was successful.    Thank you.  Mary De La Vega MA  [facility]